# Patient Record
Sex: MALE | Race: WHITE | NOT HISPANIC OR LATINO | Employment: FULL TIME | URBAN - METROPOLITAN AREA
[De-identification: names, ages, dates, MRNs, and addresses within clinical notes are randomized per-mention and may not be internally consistent; named-entity substitution may affect disease eponyms.]

---

## 2018-06-22 ENCOUNTER — CONVERSION ENCOUNTER (OUTPATIENT)
Dept: FAMILY MEDICINE CLINIC | Facility: CLINIC | Age: 48
End: 2018-06-22

## 2018-06-22 ENCOUNTER — OFFICE VISIT CONVERTED (OUTPATIENT)
Dept: FAMILY MEDICINE CLINIC | Facility: CLINIC | Age: 48
End: 2018-06-22
Attending: PHYSICIAN ASSISTANT

## 2018-08-28 ENCOUNTER — OFFICE VISIT CONVERTED (OUTPATIENT)
Dept: FAMILY MEDICINE CLINIC | Facility: CLINIC | Age: 48
End: 2018-08-28
Attending: NURSE PRACTITIONER

## 2018-11-28 ENCOUNTER — OFFICE VISIT CONVERTED (OUTPATIENT)
Dept: FAMILY MEDICINE CLINIC | Facility: CLINIC | Age: 48
End: 2018-11-28
Attending: NURSE PRACTITIONER

## 2019-09-24 ENCOUNTER — HOSPITAL ENCOUNTER (OUTPATIENT)
Dept: LAB | Facility: HOSPITAL | Age: 49
Discharge: HOME | End: 2019-09-24
Attending: NURSE PRACTITIONER

## 2019-09-24 LAB
ALBUMIN SERPL-MCNC: 4.6 G/DL (ref 3.5–5)
ALBUMIN/GLOB SERPL: 1.5 {RATIO} (ref 1.4–2.6)
ALP SERPL-CCNC: 55 U/L (ref 53–128)
ALT SERPL-CCNC: 45 U/L (ref 10–40)
ANION GAP SERPL CALC-SCNC: 24 MMOL/L (ref 8–19)
AST SERPL-CCNC: 27 U/L (ref 15–50)
BASOPHILS # BLD AUTO: 0.04 10*3/UL (ref 0–0.2)
BASOPHILS NFR BLD AUTO: 0.6 % (ref 0–3)
BILIRUB SERPL-MCNC: 0.35 MG/DL (ref 0.2–1.3)
BUN SERPL-MCNC: 20 MG/DL (ref 5–25)
BUN/CREAT SERPL: 16 {RATIO} (ref 6–20)
CALCIUM SERPL-MCNC: 9.9 MG/DL (ref 8.7–10.4)
CHLORIDE SERPL-SCNC: 101 MMOL/L (ref 99–111)
CHOLEST SERPL-MCNC: 184 MG/DL (ref 107–200)
CHOLEST/HDLC SERPL: 5.9 {RATIO} (ref 3–6)
CONV ABS IMM GRAN: 0.05 10*3/UL (ref 0–0.2)
CONV CO2: 21 MMOL/L (ref 22–32)
CONV CREATININE URINE, RANDOM: 127.5 MG/DL (ref 10–300)
CONV IMMATURE GRAN: 0.7 % (ref 0–1.8)
CONV MICROALBUM.,U,RANDOM: <12 MG/L (ref 0–20)
CONV TOTAL PROTEIN: 7.7 G/DL (ref 6.3–8.2)
CREAT UR-MCNC: 1.26 MG/DL (ref 0.7–1.2)
DEPRECATED RDW RBC AUTO: 40 FL (ref 35.1–43.9)
EOSINOPHIL # BLD AUTO: 0.17 10*3/UL (ref 0–0.7)
EOSINOPHIL # BLD AUTO: 2.4 % (ref 0–7)
ERYTHROCYTE [DISTWIDTH] IN BLOOD BY AUTOMATED COUNT: 12.9 % (ref 11.6–14.4)
EST. AVERAGE GLUCOSE BLD GHB EST-MCNC: 171 MG/DL
FOLATE SERPL-MCNC: 19.1 NG/ML (ref 4.8–20)
GFR SERPLBLD BASED ON 1.73 SQ M-ARVRAT: >60 ML/MIN/{1.73_M2}
GLOBULIN UR ELPH-MCNC: 3.1 G/DL (ref 2–3.5)
GLUCOSE SERPL-MCNC: 178 MG/DL (ref 70–99)
HBA1C MFR BLD: 7.6 % (ref 3.5–5.7)
HCT VFR BLD AUTO: 44.8 % (ref 42–52)
HDLC SERPL-MCNC: 31 MG/DL (ref 40–60)
HGB BLD-MCNC: 15 G/DL (ref 14–18)
LDLC SERPL CALC-MCNC: 103 MG/DL (ref 70–100)
LYMPHOCYTES # BLD AUTO: 2.03 10*3/UL (ref 1–5)
LYMPHOCYTES NFR BLD AUTO: 29 % (ref 20–45)
MCH RBC QN AUTO: 28.8 PG (ref 27–31)
MCHC RBC AUTO-ENTMCNC: 33.5 G/DL (ref 33–37)
MCV RBC AUTO: 86.2 FL (ref 80–96)
MICROALBUMIN/CREAT UR: 9.4 MG/G{CRE} (ref 0–25)
MONOCYTES # BLD AUTO: 0.51 10*3/UL (ref 0.2–1.2)
MONOCYTES NFR BLD AUTO: 7.3 % (ref 3–10)
NEUTROPHILS # BLD AUTO: 4.19 10*3/UL (ref 2–8)
NEUTROPHILS NFR BLD AUTO: 60 % (ref 30–85)
NRBC CBCN: 0 % (ref 0–0.7)
OSMOLALITY SERPL CALC.SUM OF ELEC: 301 MOSM/KG (ref 273–304)
PLATELET # BLD AUTO: 353 10*3/UL (ref 130–400)
PMV BLD AUTO: 10.6 FL (ref 9.4–12.4)
POTASSIUM SERPL-SCNC: 3.8 MMOL/L (ref 3.5–5.3)
PSA SERPL-MCNC: 1 NG/ML (ref 0–4)
RBC # BLD AUTO: 5.2 10*6/UL (ref 4.7–6.1)
SODIUM SERPL-SCNC: 142 MMOL/L (ref 135–147)
T4 FREE SERPL-MCNC: 1.4 NG/DL (ref 0.9–1.8)
TRIGL SERPL-MCNC: 404 MG/DL (ref 40–150)
TSH SERPL-ACNC: 2.12 M[IU]/L (ref 0.27–4.2)
VIT B12 SERPL-MCNC: 617 PG/ML (ref 211–911)
WBC # BLD AUTO: 6.99 10*3/UL (ref 4.8–10.8)

## 2019-09-26 ENCOUNTER — CONVERSION ENCOUNTER (OUTPATIENT)
Dept: FAMILY MEDICINE CLINIC | Facility: CLINIC | Age: 49
End: 2019-09-26

## 2019-09-26 ENCOUNTER — OFFICE VISIT CONVERTED (OUTPATIENT)
Dept: FAMILY MEDICINE CLINIC | Facility: CLINIC | Age: 49
End: 2019-09-26
Attending: NURSE PRACTITIONER

## 2019-10-04 ENCOUNTER — HOSPITAL ENCOUNTER (OUTPATIENT)
Dept: LAB | Facility: HOSPITAL | Age: 49
Discharge: HOME | End: 2019-10-04
Attending: NURSE PRACTITIONER

## 2019-10-04 LAB — TESTOST SERPL-MCNC: 163 NG/DL (ref 193–740)

## 2019-10-06 LAB — TESTOSTERONE, FREE: 4.4 PG/ML (ref 6.8–21.5)

## 2019-10-14 ENCOUNTER — CONVERSION ENCOUNTER (OUTPATIENT)
Dept: FAMILY MEDICINE CLINIC | Facility: CLINIC | Age: 49
End: 2019-10-14

## 2019-10-14 ENCOUNTER — OFFICE VISIT CONVERTED (OUTPATIENT)
Dept: FAMILY MEDICINE CLINIC | Facility: CLINIC | Age: 49
End: 2019-10-14
Attending: NURSE PRACTITIONER

## 2020-05-12 ENCOUNTER — CONVERSION ENCOUNTER (OUTPATIENT)
Dept: FAMILY MEDICINE CLINIC | Facility: CLINIC | Age: 50
End: 2020-05-12

## 2020-05-12 ENCOUNTER — OFFICE VISIT CONVERTED (OUTPATIENT)
Dept: FAMILY MEDICINE CLINIC | Facility: CLINIC | Age: 50
End: 2020-05-12
Attending: NURSE PRACTITIONER

## 2020-09-25 ENCOUNTER — HOSPITAL ENCOUNTER (OUTPATIENT)
Dept: LAB | Facility: HOSPITAL | Age: 50
Discharge: HOME | End: 2020-09-25
Attending: NURSE PRACTITIONER

## 2020-09-25 LAB
ALBUMIN SERPL-MCNC: 4.7 G/DL (ref 3.5–5)
ALBUMIN/GLOB SERPL: 1.7 {RATIO} (ref 1.4–2.6)
ALP SERPL-CCNC: 51 U/L (ref 53–128)
ALT SERPL-CCNC: 46 U/L (ref 10–40)
ANION GAP SERPL CALC-SCNC: 22 MMOL/L (ref 8–19)
AST SERPL-CCNC: 27 U/L (ref 15–50)
BASOPHILS # BLD AUTO: 0.04 10*3/UL (ref 0–0.2)
BASOPHILS NFR BLD AUTO: 0.5 % (ref 0–3)
BILIRUB SERPL-MCNC: 0.41 MG/DL (ref 0.2–1.3)
BUN SERPL-MCNC: 21 MG/DL (ref 5–25)
BUN/CREAT SERPL: 17 {RATIO} (ref 6–20)
CALCIUM SERPL-MCNC: 9.7 MG/DL (ref 8.7–10.4)
CHLORIDE SERPL-SCNC: 102 MMOL/L (ref 99–111)
CHOLEST SERPL-MCNC: 170 MG/DL (ref 107–200)
CHOLEST/HDLC SERPL: 6.1 {RATIO} (ref 3–6)
CONV ABS IMM GRAN: 0.04 10*3/UL (ref 0–0.2)
CONV CO2: 21 MMOL/L (ref 22–32)
CONV IMMATURE GRAN: 0.5 % (ref 0–1.8)
CONV TOTAL PROTEIN: 7.5 G/DL (ref 6.3–8.2)
CREAT UR-MCNC: 1.23 MG/DL (ref 0.7–1.2)
DEPRECATED RDW RBC AUTO: 40.8 FL (ref 35.1–43.9)
EOSINOPHIL # BLD AUTO: 0.2 10*3/UL (ref 0–0.7)
EOSINOPHIL # BLD AUTO: 2.5 % (ref 0–7)
ERYTHROCYTE [DISTWIDTH] IN BLOOD BY AUTOMATED COUNT: 13.1 % (ref 11.6–14.4)
GFR SERPLBLD BASED ON 1.73 SQ M-ARVRAT: >60 ML/MIN/{1.73_M2}
GLOBULIN UR ELPH-MCNC: 2.8 G/DL (ref 2–3.5)
GLUCOSE SERPL-MCNC: 167 MG/DL (ref 70–99)
HCT VFR BLD AUTO: 47.8 % (ref 42–52)
HDLC SERPL-MCNC: 28 MG/DL (ref 40–60)
HGB BLD-MCNC: 16.3 G/DL (ref 14–18)
LDLC SERPL CALC-MCNC: 83 MG/DL (ref 70–100)
LYMPHOCYTES # BLD AUTO: 2.46 10*3/UL (ref 1–5)
LYMPHOCYTES NFR BLD AUTO: 31 % (ref 20–45)
MCH RBC QN AUTO: 29.4 PG (ref 27–31)
MCHC RBC AUTO-ENTMCNC: 34.1 G/DL (ref 33–37)
MCV RBC AUTO: 86.3 FL (ref 80–96)
MONOCYTES # BLD AUTO: 0.7 10*3/UL (ref 0.2–1.2)
MONOCYTES NFR BLD AUTO: 8.8 % (ref 3–10)
NEUTROPHILS # BLD AUTO: 4.5 10*3/UL (ref 2–8)
NEUTROPHILS NFR BLD AUTO: 56.7 % (ref 30–85)
NRBC CBCN: 0 % (ref 0–0.7)
OSMOLALITY SERPL CALC.SUM OF ELEC: 299 MOSM/KG (ref 273–304)
PLATELET # BLD AUTO: 322 10*3/UL (ref 130–400)
PMV BLD AUTO: 10.7 FL (ref 9.4–12.4)
POTASSIUM SERPL-SCNC: 3.7 MMOL/L (ref 3.5–5.3)
PSA SERPL-MCNC: 0.96 NG/ML (ref 0–4)
RBC # BLD AUTO: 5.54 10*6/UL (ref 4.7–6.1)
SODIUM SERPL-SCNC: 141 MMOL/L (ref 135–147)
T4 FREE SERPL-MCNC: 1.5 NG/DL (ref 0.9–1.8)
TESTOST SERPL-MCNC: 149 NG/DL (ref 193–740)
TRIGL SERPL-MCNC: 545 MG/DL (ref 40–150)
TSH SERPL-ACNC: 1.49 M[IU]/L (ref 0.27–4.2)
WBC # BLD AUTO: 7.94 10*3/UL (ref 4.8–10.8)

## 2021-05-13 NOTE — PROGRESS NOTES
Progress Note      Patient Name: Hitesh Pearce   Patient ID: [id number]   Sex: Male   YOB: 1970    Primary Care Provider: Vicky ROGER   Referring Provider: Vicky ROGER    Visit Date: May 12, 2020    Provider: ACACIA Trujillo   Location: [facility]   Location Address: [address] [place] [address], [address]  [place], [place]  [id number]   Location Phone: [phone]          Chief Complaint  · follow up on Testosterone  · screening colonoscopy referral  · cyst     1. he would like to discuss Testosterone lab results again.     2. he has a cyst on his back/left hip area that has grown.     3. he needs a referral for a colon screening       History Of Present Illness  Hitesh Pearce is a 50 year old /White male who presents for evaluation and treatment of:      the patient presents today in the office and he has h/o HTN, hyperlipidemia, NIDDM, ED, hypogonadism he is here for med check and med refill he has been off his testosterone cream for months and would like to restart he needs some labs done he needs screening for colon cancer....in addition he has a cyst on left posterior hip appears to be sebaceous we have discussed this as benign today and he would like Viagra for his ED         Past Medical History  Disease Name Date Onset Notes   Allergic rhinitis --  --    Diabetes mellitus, type 2 06/20/2016 --    Diabetes Mellitus, Type II --  --    Essential hypertension 06/22/2018 --    Hyperlipidemia --  --    Hypertension --  --    Hypothyroidism 06/20/2016 --          Past Surgical History  Procedure Name Date Notes   Adenoidectomy --  --    Arthoscopic knee surgery 2013 Right   Colonoscopy --  --    Tonsillectomy --  --          Medication List  Name Date Started Instructions   escitalopram oxalate 10 mg oral tablet 10/03/2019 TAKE 1 TABLET BY MOUTH ONCE DAILY   fenofibric acid (choline) 135 mg oral capsule,delayed release(/EC) 11/22/2019 TAKE 1 CAPSULE BY MOUTH ONCE  DAILY   Fish Oil 1,000 mg (120 mg-180 mg) oral capsule  take 2 capsules by oral route daily   Flonase Allergy Relief 50 mcg/actuation nasal spray,suspension 04/18/2017 inhale 1 puff by nasal route daily   Jardiance 25 mg oral tablet 01/22/2020 take 1 tablet (25 mg) by oral route once daily in the morning for 30 days for 90 days   levothyroxine 75 mcg oral tablet 10/07/2019 TAKE 1 TABLET BY MOUTH ONCE DAILY   loratadine 10 mg Oral tablet 10/06/2016 take 1 tablet (10 mg) by oral route once daily for 90 days   metformin 1,000 mg oral tablet 10/22/2019 TAKE 1 TABLET BY MOUTH TWO TIMES A DAY   multivitamin oral tablet  take 1 tablet by oral route QD (OTC)   olmesartan-hydrochlorothiazide 20-12.5 mg oral tablet 12/23/2019 take 1 tablet by oral route once daily   OneTouch Delica Lancets 33 gauge miscellaneous misc 10/05/2017 check blood sugar once daily.   OneTouch Verio miscellaneous strip 10/05/2017 Check blood sugar once daily   OneTouch Verio miscellaneous strip 12/05/2018 CHECK BLOOD SUGAR DAILY   simvastatin 40 mg oral tablet 05/05/2020 TAKE 1 TABLET BY MOUTH EVERY EVENING   Singulair 10 mg oral tablet 01/14/2020 TAKE ONE TABLET BY MOUTH EVERY EVENING for 90 days   Trulicity 1.5 mg/0.5 mL subcutaneous pen injector 09/26/2019 inject 0.5 milliliter (1.5 mg) by subcutaneous route every 7 days in the abdomen, thigh, or upper arm rotating injection sites for 30 days   Zocor 40 mg oral tablet 05/05/2020 TAKE ONE TABLET BY MOUTH EVERY EVENING for 90 days for 90 days         Allergy List  Allergen Name Date Reaction Notes   NO KNOWN DRUG ALLERGIES --  --  --          Family Medical History  Disease Name Relative/Age Notes   Heart Disease Father/   --          Social History  Finding Status Start/Stop Quantity Notes   Active but no formal exercise --  --/-- --  --    Alcohol Current some day --/-- socially 06/22/2018 -     --  --/-- --  --    No known infection risk --  --/-- --  --    Tobacco Never --/-- --  --           Immunizations  NameDate Admin Mfg Trade Name Lot Number Route Inj VIS Given VIS Publication   Hepatitis A06/22/2018 SKB HAVRIX-ADULT  IM LD 06/22/2018 07/20/2016   Comments: tolerated well, pt instructed to wait 20 minutes after inj   Lfghnaxdc85/26/2019 PMC Fluzone Quadrivalent [id number] IM LD 09/26/2019    Comments: tolerated well   Zuttzvdhd04/28/2018 PMC Fluzone Quadrivalent [id number] IM RD 11/28/2018 08/07/2015   Comments: tolerated well   Htonwpjre47/23/2017 PMC Fluzone > 3 Years [id number] IM RD 10/23/2017 08/07/2015   Comments: pt tolerated well   Euhdjdcsi17/05/2015 SKB Fluarix, quadrivalent, preservative free [id number] IM RA 11/05/2015 10/15/2015   Comments:    Smvstlpci08/13/2013 SKB Fluarix-PF > 3 Years [id number] IM RD 12/13/2013 07/02/2012   Comments: Pt tolerated well   TdapRefused 09/28/2016 NE Not Entered  NE NE     Comments: Pt refused.         Review of Systems  · Constitutional  o Denies  o : fever, weight gain, weight loss, malaise/fatigue  · Eyes  o Denies  o : diplopia, recent changes, blurred vision,   · HENT  o Denies  o : sore throat, hearing changes, tinnitus, nose bleeding, sinus pain, nasal discharge, ear pain  · Breasts  o Denies  o : lumps, tenderness, swelling, nipple discharge  · Cardiovascular  o Denies  o : palpitation, chest pain, claudication, pedal edema  · Respiratory  o Denies  o : shortness of breath, CASTILLO, cough  · Gastrointestinal  o Denies  o : nausea, vomiting, reflux, diarrhea, constipation, abdominal pain, blood in stools  · Genitourinary  o Admits  o : ED, hypogonadism  o Denies  o : frequency, urgency, dysuria, incontinence, nocturia,   · Integument  o Admits  o : new skin lesions sebaceous cyst left posterior hip  · Neurologic  o Denies  o : unsteady gait, weakness, dizziness, H/A  · Musculoskeletal  o Denies  o : myalgias, joint pain, joint swelling  · Endocrine  o Admits  o : NIDDM  o Denies  o : heat intolerance, cold intolerance, polyuria,  polydipsia  · Psychiatric  o Denies  o : mood changes, hallucinations, memory  · Heme-Lymph  o Denies  o : easy bleeding, easy bruising, edema, lymph node enlargement or tenderness      Vitals  Date Time BP Position Site L\R Cuff Size HR RR TEMP (F) WT  HT  BMI kg/m2 BSA m2 O2 Sat        05/12/2020 03:19 /70 Sitting    75 - R   221lbs 0oz 6'   29.97 2.26 98 %          Physical Examination  · Constitutional  o Appearance  o : well-nourished, well developed, alert, in no acute distress  · Neck  o Thyroid  o : gland size normal, nontender, no nodules or masses present on palpation, symmetric  · Respiratory  o Respiratory Effort  o : breathing unlabored  o Auscultation of Lungs  o : normal breath sounds throughout  · Cardiovascular  o Heart  o :   § Auscultation of Heart  § : regular rate and rhythm, no murmurs, gallops or rubs  § Palpation of Heart  § : normal apical impulse, no cardiac thrill present  o Peripheral Vascular System  o :   § Carotid Arteries  § : normal pulses bilaterally, no bruits present  § Extremities  § : no cyanosis, clubbing or edema; less than 2 second refill noted  · Skin and Subcutaneous Tissue  o General Inspection  o : sebaceous cyst 7-8 mm left posterior hip  · Neurologic  o Mental Status Examination  o :   § Orientation  § : grossly oriented to person, place and time  o Cranial Nerves  o : cranial nerves intact and symmetric throughout  · Psychiatric  o Mood and Affect  o : mood normal, affect appropriate, denies any SI/HI              Assessment  · Diabetes mellitus, type 2     250.00/E11.9  · Essential hypertension     401.9/I10  · Hyperlipidemia     272.4/E78.5  · Hypogonadism, male     257.2/E29.1  · Screening for colon cancer     V76.51/Z12.11  · Low testosterone in male     790.99/R79.89  · Skin lesion     709.9/L98.9  · Erectile dysfunction     607.84/N52.9  · Sebaceous cyst     706.2/L72.3      Plan  · Orders  o Hgb A1c [facility] ([id number]) - 250.00/E11.9, 272.4/E78.5, 401.9/I10,  257.2/E29.1 - 08/03/2020  o Male Physical Primary Care Panel (CMP, CBC, TSH, Lipid, PSA) [facility] ([id number], [id number], [id number], [id number], [id number], ) - 250.00/E11.9, 272.4/E78.5, 401.9/I10, 257.2/E29.1 - 08/03/2020  o ALYSA Report (KASPR) - - 05/16/2020  o ACO-39: Current medications updated and reviewed () - - 05/12/2020  o Zoë ([id number]) - - 05/12/2020  · Medications  o sildenafil 50 mg oral tablet   SIG: take 1 tablet (50 mg) by oral route once daily as needed approximately 1 hour before sexual activity for 30 days   DISP: (30) tablets with 11 refills  Prescribed on 05/12/2020     o testosterone cream 100 mg/gram   SIG: compound and apply 50mg to scrotum daily   DISP: (1) pump with 2 refills  Adjusted on 05/16/2020     o Medications have been Reconciled  o Transition of Care or Provider Policy  · Instructions  o Advised that cheeses and other sources of dairy fats, animal fats, fast food, and the extras (candy, pastries, pies, doughnuts and cookies) all contain LDL raising nutrients. Advised to increase fruits, vegetables, whole grains, and to monitor portion sizes.   o Handouts were given to patient:   o Patient is taking medications as prescribed and doing well.   o Take all medications as prescribed/directed.  o Patient was educated/instructed on their diagnosis, treatment and medications prior to discharge from the clinic today.  o Patient instructed to seek medical attention urgently for new or worsening symptoms.  o Call the office with any concerns or questions.  o Risks, benefits, and alternatives were discussed with the patient. The patient is aware of risks associated with: medications  o Chronic conditions reviewed and taken into consideration for today's treatment plan.  · Disposition  o Call or Return if symptoms worsen or persist.  o follow up 3 months  o follow up prn or as needed  o Care Transition            Electronically Signed by: ACACIA Trujillo -Author on May 16, 2020 12:58:37 PM

## 2021-05-15 VITALS
HEART RATE: 90 BPM | SYSTOLIC BLOOD PRESSURE: 120 MMHG | BODY MASS INDEX: 30.48 KG/M2 | OXYGEN SATURATION: 96 % | WEIGHT: 225 LBS | HEIGHT: 72 IN | DIASTOLIC BLOOD PRESSURE: 80 MMHG

## 2021-05-15 VITALS
WEIGHT: 225 LBS | HEART RATE: 76 BPM | BODY MASS INDEX: 30.48 KG/M2 | SYSTOLIC BLOOD PRESSURE: 120 MMHG | OXYGEN SATURATION: 98 % | HEIGHT: 72 IN | DIASTOLIC BLOOD PRESSURE: 80 MMHG

## 2021-05-15 VITALS
BODY MASS INDEX: 29.93 KG/M2 | SYSTOLIC BLOOD PRESSURE: 118 MMHG | HEART RATE: 75 BPM | OXYGEN SATURATION: 98 % | HEIGHT: 72 IN | DIASTOLIC BLOOD PRESSURE: 70 MMHG | WEIGHT: 221 LBS

## 2021-05-16 VITALS
HEART RATE: 100 BPM | HEIGHT: 72 IN | WEIGHT: 226.37 LBS | BODY MASS INDEX: 30.66 KG/M2 | DIASTOLIC BLOOD PRESSURE: 85 MMHG | SYSTOLIC BLOOD PRESSURE: 109 MMHG

## 2021-05-16 VITALS
DIASTOLIC BLOOD PRESSURE: 84 MMHG | SYSTOLIC BLOOD PRESSURE: 112 MMHG | HEART RATE: 86 BPM | OXYGEN SATURATION: 97 % | BODY MASS INDEX: 30.91 KG/M2 | TEMPERATURE: 97.6 F | HEIGHT: 72 IN | WEIGHT: 228.25 LBS

## 2021-05-16 VITALS
WEIGHT: 229.5 LBS | BODY MASS INDEX: 31.08 KG/M2 | HEIGHT: 72 IN | DIASTOLIC BLOOD PRESSURE: 71 MMHG | SYSTOLIC BLOOD PRESSURE: 117 MMHG | HEART RATE: 87 BPM

## 2021-11-04 ENCOUNTER — HOSPITAL ENCOUNTER (OUTPATIENT)
Dept: HOSPITAL 49 - FAS | Age: 51
Discharge: HOME | End: 2021-11-04
Attending: SURGERY
Payer: COMMERCIAL

## 2021-11-04 VITALS — BODY MASS INDEX: 26 KG/M2 | WEIGHT: 191.98 LBS | HEIGHT: 72 IN

## 2021-11-04 DIAGNOSIS — Z12.12: ICD-10-CM

## 2021-11-04 DIAGNOSIS — I10: ICD-10-CM

## 2021-11-04 DIAGNOSIS — K57.30: ICD-10-CM

## 2021-11-04 DIAGNOSIS — E11.9: ICD-10-CM

## 2021-11-04 DIAGNOSIS — K63.5: ICD-10-CM

## 2021-11-04 DIAGNOSIS — K58.9: ICD-10-CM

## 2021-11-04 DIAGNOSIS — Z12.11: Primary | ICD-10-CM

## 2021-11-04 DIAGNOSIS — Z79.84: ICD-10-CM

## 2021-11-04 LAB
ALBUMIN SERPL-MCNC: 4.2 G/DL (ref 3.4–5)
ALKALINE PHOSHATASE: 45 U/L (ref 46–116)
ALT SERPL-CCNC: 48 U/L (ref 16–63)
AST: 43 U/L (ref 15–37)
BILIRUBIN - TOTAL: 0.6 MG/DL (ref 0.2–1)
BUN SERPL-MCNC: 20 MG/DL (ref 7–18)
BUN/CREAT RATIO (CALC): 18 RATIO
CHLORIDE: 104 MMOL/L (ref 98–107)
CO2 (BICARBONATE): 27 MMOL/L (ref 21–32)
CREATININE: 1.11 MG/DL (ref 0.67–1.17)
GLOBULIN (CALCULATION): 3.7 G/DL
GLUCOSE SERPL-MCNC: 116 MG/DL (ref 74–106)
HCT: 45.9 % (ref 42–52)
HGB BLD-MCNC: 15.6 G/DL (ref 13.2–18)
MCH RBC QN AUTO: 29.1 PG (ref 25–31)
MCHC RBC AUTO-ENTMCNC: 34 G/DL (ref 32–36)
MCV: 85.6 FL (ref 78–100)
MPV: 9.9 FL (ref 6–9.5)
PLT: 311 K/UL (ref 150–400)
POTASSIUM: 3.8 MMOL/L (ref 3.5–5.1)
RBC: 5.36 M/UL (ref 4.7–6)
RDW: 16.3 % (ref 11.5–14)
TOTAL PROTEIN: 7.9 G/DL (ref 6.4–8.2)
WBC: 9.1 K/UL (ref 4–10.5)

## 2023-05-31 ENCOUNTER — TRANSCRIBE ORDERS (OUTPATIENT)
Dept: ADMINISTRATIVE | Facility: HOSPITAL | Age: 53
End: 2023-05-31

## 2023-05-31 DIAGNOSIS — Z82.49 FAMILY HISTORY OF ISCHEMIC HEART DISEASE: Primary | ICD-10-CM

## 2023-06-15 ENCOUNTER — HOSPITAL ENCOUNTER (OUTPATIENT)
Dept: CT IMAGING | Facility: HOSPITAL | Age: 53
Discharge: HOME | End: 2023-06-15

## 2023-06-15 DIAGNOSIS — Z82.49 FAMILY HISTORY OF ISCHEMIC HEART DISEASE: ICD-10-CM

## 2023-06-15 PROCEDURE — 75571 CT HRT W/O DYE W/CA TEST: CPT
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
## 2024-07-02 ENCOUNTER — OFFICE VISIT (OUTPATIENT)
Dept: PSYCHIATRY | Facility: CLINIC | Age: 54
End: 2024-07-02
Payer: COMMERCIAL

## 2024-07-02 VITALS
HEIGHT: 72 IN | SYSTOLIC BLOOD PRESSURE: 140 MMHG | DIASTOLIC BLOOD PRESSURE: 85 MMHG | BODY MASS INDEX: 28.01 KG/M2 | WEIGHT: 206.8 LBS | HEART RATE: 90 BPM

## 2024-07-02 DIAGNOSIS — F43.10 COMPLEX POSTTRAUMATIC STRESS DISORDER: ICD-10-CM

## 2024-07-02 DIAGNOSIS — F51.04 INSOMNIA, PSYCHOPHYSIOLOGICAL: ICD-10-CM

## 2024-07-02 DIAGNOSIS — F41.1 GENERALIZED ANXIETY DISORDER: Primary | ICD-10-CM

## 2024-07-02 DIAGNOSIS — Z73.1 ACCENTUATION OF PERSONALITY TRAITS: ICD-10-CM

## 2024-07-02 RX ORDER — CLONIDINE HYDROCHLORIDE 0.1 MG/1
0.1 TABLET ORAL 2 TIMES DAILY
Qty: 60 TABLET | Refills: 1 | Status: SHIPPED | OUTPATIENT
Start: 2024-07-02 | End: 2024-08-31

## 2024-07-02 RX ORDER — TRAZODONE HYDROCHLORIDE 50 MG/1
TABLET ORAL
COMMUNITY
Start: 2024-04-22

## 2024-07-02 RX ORDER — HYDROCODONE BITARTRATE AND ACETAMINOPHEN 5; 325 MG/1; MG/1
1 TABLET ORAL EVERY 6 HOURS
COMMUNITY
Start: 2024-05-07 | End: 2024-07-02

## 2024-07-02 RX ORDER — CLONIDINE HYDROCHLORIDE 0.1 MG/1
0.1 TABLET ORAL 2 TIMES DAILY
Qty: 60 TABLET | Refills: 1 | Status: SHIPPED | OUTPATIENT
Start: 2024-07-02 | End: 2024-07-02

## 2024-07-02 RX ORDER — SILDENAFIL 50 MG/1
TABLET, FILM COATED ORAL
COMMUNITY
Start: 2024-05-31

## 2024-07-02 RX ORDER — IBUPROFEN 800 MG/1
TABLET ORAL
COMMUNITY
Start: 2024-05-07

## 2024-07-05 RX ORDER — ESCITALOPRAM OXALATE 20 MG/1
20 TABLET ORAL EVERY MORNING
Qty: 30 TABLET | Refills: 2 | Status: SHIPPED | OUTPATIENT
Start: 2024-07-05 | End: 2024-10-03

## 2024-07-05 NOTE — TREATMENT PLAN
Multi-Disciplinary Problems (from Behavioral Health Treatment Plan)      Active Problems       Problem: Anxiety  Start Date: 07/05/24      Problem Details: The patient self-scales this problem as a 2 with 10 being the worst.          Goal Priority Start Date Expected End Date End Date    Patient will develop and implement behavioral and cognitive strategies to reduce anxiety and irrational fears. -- 07/05/24 -- --    Goal Details: Progress toward goal:  The patient self-scales their progress related to this goal as a 2 with 10 being the worst.          Goal Intervention Frequency Start Date End Date    Help patient explore past emotional issues in relation to present anxiety. Weekly 07/05/24 --    Intervention Details: Duration of treatment until until remission of symptoms.          Goal Intervention Frequency Start Date End Date    Help patient develop an awareness of their cognitive and physical responses to anxiety. Weekly 07/05/24 --    Intervention Details: Duration of treatment until until remission of symptoms.                          Reviewed By       Sosa Alvarado APRN 07/05/24 [id number]                     I have discussed and reviewed this treatment plan with the patient.  It has been printed for signatures.

## 2025-03-20 ENCOUNTER — TELEPHONE (OUTPATIENT)
Dept: PSYCHIATRY | Facility: CLINIC | Age: 55
End: 2025-03-20
Payer: COMMERCIAL

## 2025-03-20 NOTE — TELEPHONE ENCOUNTER
Patient was seen on 07/02024, patient was referred out for neuropsych testing, patient has not been seen since 07/02/2024, closing out referral order.